# Patient Record
Sex: FEMALE | Race: WHITE | HISPANIC OR LATINO | URBAN - METROPOLITAN AREA
[De-identification: names, ages, dates, MRNs, and addresses within clinical notes are randomized per-mention and may not be internally consistent; named-entity substitution may affect disease eponyms.]

---

## 2020-03-16 ENCOUNTER — TELEPHONE (OUTPATIENT)
Dept: OBSTETRICS AND GYNECOLOGY | Facility: CLINIC | Age: 30
End: 2020-03-16

## 2020-04-27 ENCOUNTER — TELEPHONE (OUTPATIENT)
Dept: OBSTETRICS AND GYNECOLOGY | Facility: CLINIC | Age: 30
End: 2020-04-27

## 2020-06-01 ENCOUNTER — OFFICE VISIT (OUTPATIENT)
Dept: OBSTETRICS AND GYNECOLOGY | Facility: CLINIC | Age: 30
End: 2020-06-01
Payer: COMMERCIAL

## 2020-06-01 VITALS
SYSTOLIC BLOOD PRESSURE: 108 MMHG | BODY MASS INDEX: 23.49 KG/M2 | DIASTOLIC BLOOD PRESSURE: 62 MMHG | WEIGHT: 137.56 LBS | HEIGHT: 64 IN

## 2020-06-01 DIAGNOSIS — Z01.419 ENCOUNTER FOR WELL WOMAN EXAM WITH ROUTINE GYNECOLOGICAL EXAM: Primary | ICD-10-CM

## 2020-06-01 DIAGNOSIS — E28.2 PCOS (POLYCYSTIC OVARIAN SYNDROME): ICD-10-CM

## 2020-06-01 DIAGNOSIS — Z31.69 INFERTILITY COUNSELING: ICD-10-CM

## 2020-06-01 LAB
B-HCG UR QL: NEGATIVE
CTP QC/QA: YES

## 2020-06-01 PROCEDURE — 99999 PR PBB SHADOW E&M-EST. PATIENT-LVL II: ICD-10-PCS | Mod: PBBFAC,,, | Performed by: OBSTETRICS & GYNECOLOGY

## 2020-06-01 PROCEDURE — 99385 PR PREVENTIVE VISIT,NEW,18-39: ICD-10-PCS | Mod: S$GLB,,, | Performed by: OBSTETRICS & GYNECOLOGY

## 2020-06-01 PROCEDURE — 81025 URINE PREGNANCY TEST: CPT | Mod: S$GLB,,, | Performed by: OBSTETRICS & GYNECOLOGY

## 2020-06-01 PROCEDURE — 88175 CYTOPATH C/V AUTO FLUID REDO: CPT

## 2020-06-01 PROCEDURE — 81025 POCT URINE PREGNANCY: ICD-10-PCS | Mod: S$GLB,,, | Performed by: OBSTETRICS & GYNECOLOGY

## 2020-06-01 PROCEDURE — 99385 PREV VISIT NEW AGE 18-39: CPT | Mod: S$GLB,,, | Performed by: OBSTETRICS & GYNECOLOGY

## 2020-06-01 PROCEDURE — 3008F PR BODY MASS INDEX (BMI) DOCUMENTED: ICD-10-PCS | Mod: CPTII,S$GLB,, | Performed by: OBSTETRICS & GYNECOLOGY

## 2020-06-01 PROCEDURE — 99999 PR PBB SHADOW E&M-EST. PATIENT-LVL II: CPT | Mod: PBBFAC,,, | Performed by: OBSTETRICS & GYNECOLOGY

## 2020-06-01 PROCEDURE — 3008F BODY MASS INDEX DOCD: CPT | Mod: CPTII,S$GLB,, | Performed by: OBSTETRICS & GYNECOLOGY

## 2020-06-01 NOTE — PROGRESS NOTES
"GYNECOLOGY  :  Darleen Quarles is a 30 y.o.    Here today for  gyn evaluation   Unable to conceive for +3 years.   Has been on/off Md's in the past, but has [place] followed a consistent infertility plan   Was told has PCOS, was on metformin before  Never sperm analysis or HSG   No complaints today   Normal menstrual cycles, every month, 2-3 days of bleeding w/ some cramping         History reviewed. No pertinent past medical history.  History reviewed. No pertinent surgical history.  Family History   Family history unknown: Yes     Social History     Tobacco Use    Smoking status: Never Smoker    Smokeless tobacco: Never Used   Substance Use Topics    Alcohol use: Never     Frequency: Never    Drug use: Never     OB History    Para Term  AB Living   0 0 0 0 0 0   SAB TAB Ectopic Multiple Live Births   0 0 0 0 0       /62 (BP Location: Right arm)   Ht 5' 4" (1.626 m)   Wt 62.4 kg (137 lb 9.1 oz)   LMP 2020   BMI 23.61 kg/m²     Last PAP= 1 year normal   LMP = 20  Last Mammogram = -  Last Colonoscopy  =-      COMPREHENSIVE GYN HISTORY:  G 0 P 0     PAP History: Denies abnormal Paps.  Infection History: Denies STDs. Denies PID.  Benign History: Denies uterine fibroids. Denies ovarian cysts. Denies endometriosis.   Cancer History: Denies cervical cancer. Denies uterine cancer or hyperplasia. Denies ovarian cancer. Denies vulvar cancer or pre-cancer. Denies vaginal cancer or pre-cancer. Denies breast cancer. Denies colon cancer.  Sexual Activity History: ( x ) Yes   ( - )   no   Menstrual History: Age of menarche: ( 12  )  years. Every (  30 )       days, flows for (  2 )   days. moderate  flow.  Dysmenorrhea History:  absent  Contraception:  -    ROS  GENERAL: Denies significant weight gain or weight loss. Feeling well overall.  SKIN: Denies rash or lesions.  Normal skin turgor  HEAD: Denies head injury or headache.   NODES: Denies enlarged lymph nodes. "   CHEST: Denies chest pain or shortness of breath.   CARDIOVASCULAR: Denies palpitations or left sided chest pain.   ABDOMEN: No abdominal pain,no  diarrhea,constipation  nausea, vomiting or rectal bleeding.   URINARY: normal  Frequency,no  Dysuria or burning on urination.   REPRODUCTIVE: Per HPI   BREASTS: The patient sometimes performs breast self-examination and denies pain, lumps, or nipple discharge.   HEMATOLOGIC: No easy bruisability or excessive bleeding.   MUSCULOSKELETAL: Denies joint pain or swelling.   NEUROLOGIC: Denies syncope or weakness.   PSYCHIATRIC: Denies depression, anxiety or mood swings.    Physical Exam     Constitutional: She is oriented to person, place, and time. She appears well-developed and well-nourished. No distress.   HENT:   Head: Normocephalic.   NECK: Neck symmetric without masses or thyromegaly.  Cardiovascular: Normal rate and regular rhythm.   Pulmonary/Chest: Effort normal and breath sounds normal. No respiratory distress. She has no wheezes.   Breasts: Symmetrical, no skin changes or visible lesions. No palpable masses, nipple discharge or adenopathy bilaterally.  Abdominal: Soft not distended. Bowel sounds are normal. She exhibits   no masses . No tenderness to palpation.   Genitourinary: Pelvic exam was performed with patient supine.   External genitalia normal no lesions.Normal hair distribution   Adequate perineal body,normal urethral meatus   Vagina moist and well rugated without lesions, no vaginal  Discharge.   Cervix pink and without lesions. No bleeding. No significant cystocele or rectocele.  Bimanual exam showed uterus normal size, shape and position , mobile and nontender. Adnexa without masses or tenderness. Urethra and bladder normal  Extremities normal no cyanosis ,edema.     Procedures:  Pap smear  UA/ UCx/Udip  Affirm  STD testing   US    A/P Darleen Lucas Hong Quarles 30 y.o.     Dx=  1- Infertility   2- Hx PCOs   3-    Plan:    -s/p normal breast  exam   -s/p normal pelvic exam:    Infertility counseling  Provided. I discuss the need to evaluate simultaneously  the main causes of infertility, including tubal , ovarian, uterine and male factor .  Patient counseled that up to 10 % of infertility can be of unknown origin.  Patient understands that After initial work up might need to be referred to specialized fertility center  for advanced fertility procedures .  Will see patient  Again in the clinic  after blood work, US, HSG and Sperm analysis are resulted.      Patient was counseled today on A.C.S. Pap guidelines and recommendations for yearly pelvic exams, mammograms and monthly self breast exams; to see her PCP for other health maintenance, nutrition and weight gain counseling, discussed lab values.  Discussed colonoscopy recommendations every 10 years starting at age 50   Calcium and vit D daily intake     F/u in 1 month or with results     Irineo Mann M.D.   OB/GYN

## 2020-06-05 ENCOUNTER — LAB VISIT (OUTPATIENT)
Dept: LAB | Facility: HOSPITAL | Age: 30
End: 2020-06-05
Attending: OBSTETRICS & GYNECOLOGY
Payer: COMMERCIAL

## 2020-06-05 DIAGNOSIS — E28.2 PCOS (POLYCYSTIC OVARIAN SYNDROME): ICD-10-CM

## 2020-06-05 LAB
FINAL PATHOLOGIC DIAGNOSIS: NORMAL
FSH SERPL-ACNC: 7.7 MIU/ML
Lab: NORMAL
PROLACTIN SERPL IA-MCNC: 23.9 NG/ML (ref 5.2–26.5)
TSH SERPL DL<=0.005 MIU/L-ACNC: 1.3 UIU/ML (ref 0.4–4)

## 2020-06-05 PROCEDURE — 84443 ASSAY THYROID STIM HORMONE: CPT

## 2020-06-05 PROCEDURE — 84146 ASSAY OF PROLACTIN: CPT

## 2020-06-05 PROCEDURE — 83001 ASSAY OF GONADOTROPIN (FSH): CPT

## 2020-06-05 PROCEDURE — 36415 COLL VENOUS BLD VENIPUNCTURE: CPT

## 2020-06-08 ENCOUNTER — PROCEDURE VISIT (OUTPATIENT)
Dept: OBSTETRICS AND GYNECOLOGY | Facility: CLINIC | Age: 30
End: 2020-06-08
Payer: COMMERCIAL

## 2020-06-08 DIAGNOSIS — E28.2 PCOS (POLYCYSTIC OVARIAN SYNDROME): ICD-10-CM

## 2020-06-08 PROCEDURE — 76830 PR  ECHOGRAPHY,TRANSVAGINAL: ICD-10-PCS | Mod: S$GLB,,, | Performed by: OBSTETRICS & GYNECOLOGY

## 2020-06-08 PROCEDURE — 76830 TRANSVAGINAL US NON-OB: CPT | Mod: S$GLB,,, | Performed by: OBSTETRICS & GYNECOLOGY

## 2020-06-15 ENCOUNTER — TELEPHONE (OUTPATIENT)
Dept: OBSTETRICS AND GYNECOLOGY | Facility: CLINIC | Age: 30
End: 2020-06-15

## 2020-06-15 NOTE — TELEPHONE ENCOUNTER
Patient is requesting lab results done 6/5/2020 and ultrasound results done 6/8/2020. Please advise

## 2020-06-15 NOTE — TELEPHONE ENCOUNTER
----- Message from Emmanuelle Rendon sent at 6/15/2020  2:37 PM CDT -----  Regarding: Personal  Contact: self [phone]  TEST RESULTS:   Patient would like to get test results.  Name of test (lab, mammo, etc.): Ultrasound and Labs   Date of test: 6-8-20

## 2020-06-24 ENCOUNTER — TELEPHONE (OUTPATIENT)
Dept: OBSTETRICS AND GYNECOLOGY | Facility: CLINIC | Age: 30
End: 2020-06-24

## 2020-06-24 NOTE — TELEPHONE ENCOUNTER
Patient is requesting lab and ultrasound results. Labs done 6/5/2020 and ultrasound done 6/8/2020. Please advise  
No

## 2020-07-07 ENCOUNTER — OFFICE VISIT (OUTPATIENT)
Dept: OBSTETRICS AND GYNECOLOGY | Facility: CLINIC | Age: 30
End: 2020-07-07
Payer: COMMERCIAL

## 2020-07-07 VITALS
SYSTOLIC BLOOD PRESSURE: 120 MMHG | HEIGHT: 64 IN | WEIGHT: 135.56 LBS | BODY MASS INDEX: 23.14 KG/M2 | DIASTOLIC BLOOD PRESSURE: 60 MMHG

## 2020-07-07 DIAGNOSIS — Z31.69 INFERTILITY COUNSELING: Primary | ICD-10-CM

## 2020-07-07 PROCEDURE — 99213 OFFICE O/P EST LOW 20 MIN: CPT | Mod: S$GLB,,, | Performed by: OBSTETRICS & GYNECOLOGY

## 2020-07-07 PROCEDURE — 99213 PR OFFICE/OUTPT VISIT, EST, LEVL III, 20-29 MIN: ICD-10-PCS | Mod: S$GLB,,, | Performed by: OBSTETRICS & GYNECOLOGY

## 2020-07-07 PROCEDURE — 3008F BODY MASS INDEX DOCD: CPT | Mod: CPTII,S$GLB,, | Performed by: OBSTETRICS & GYNECOLOGY

## 2020-07-07 PROCEDURE — 99999 PR PBB SHADOW E&M-EST. PATIENT-LVL II: CPT | Mod: PBBFAC,,, | Performed by: OBSTETRICS & GYNECOLOGY

## 2020-07-07 PROCEDURE — 3008F PR BODY MASS INDEX (BMI) DOCUMENTED: ICD-10-PCS | Mod: CPTII,S$GLB,, | Performed by: OBSTETRICS & GYNECOLOGY

## 2020-07-07 PROCEDURE — 99999 PR PBB SHADOW E&M-EST. PATIENT-LVL II: ICD-10-PCS | Mod: PBBFAC,,, | Performed by: OBSTETRICS & GYNECOLOGY

## 2020-07-18 NOTE — PROGRESS NOTES
"GYNECOLOGY  :  Darleen Quarles is a 30 y.o.    Here today for  FOllow up     gyn evaluation   Unable to conceive for +3 years.   Has been on/off Md's in the past, but has [place] followed a consistent infertility plan   Was told has PCOS, was on metformin before  Never sperm analysis or HSG   No complaints today   Normal menstrual cycles, every month, 2-3 days of bleeding w/ some cramping         No past medical history on file.  No past surgical history on file.  Family History   Family history unknown: Yes     Social History     Tobacco Use    Smoking status: Never Smoker    Smokeless tobacco: Never Used   Substance Use Topics    Alcohol use: Never     Frequency: Never    Drug use: Never     OB History    Para Term  AB Living   0 0 0 0 0 0   SAB TAB Ectopic Multiple Live Births   0 0 0 0 0       /60 (BP Location: Left arm, Patient Position: Sitting)   Ht 5' 4" (1.626 m)   Wt 61.5 kg (135 lb 9.3 oz)   LMP 2020   BMI 23.27 kg/m²     Last PAP= 1 year normal   LMP = 20  Last Mammogram = -  Last Colonoscopy  =-      COMPREHENSIVE GYN HISTORY:  G 0 P 0     PAP History: Denies abnormal Paps.  Infection History: Denies STDs. Denies PID.  Benign History: Denies uterine fibroids. Denies ovarian cysts. Denies endometriosis.   Cancer History: Denies cervical cancer. Denies uterine cancer or hyperplasia. Denies ovarian cancer. Denies vulvar cancer or pre-cancer. Denies vaginal cancer or pre-cancer. Denies breast cancer. Denies colon cancer.  Sexual Activity History: ( x ) Yes   ( - )   no   Menstrual History: Age of menarche: ( 12  )  years. Every (  30 )       days, flows for (  2 )   days. moderate  flow.  Dysmenorrhea History:  absent  Contraception:  -    ROS  GENERAL: Denies significant weight gain or weight loss. Feeling well overall.  SKIN: Denies rash or lesions.  Normal skin turgor  HEAD: Denies head injury or headache.   NODES: Denies enlarged lymph nodes. "   CHEST: Denies chest pain or shortness of breath.   CARDIOVASCULAR: Denies palpitations or left sided chest pain.   ABDOMEN: No abdominal pain,no  diarrhea,constipation  nausea, vomiting or rectal bleeding.   URINARY: normal  Frequency,no  Dysuria or burning on urination.   REPRODUCTIVE: Per HPI   BREASTS: The patient sometimes performs breast self-examination and denies pain, lumps, or nipple discharge.   HEMATOLOGIC: No easy bruisability or excessive bleeding.   MUSCULOSKELETAL: Denies joint pain or swelling.   NEUROLOGIC: Denies syncope or weakness.   PSYCHIATRIC: Denies depression, anxiety or mood swings.    Physical Exam     Constitutional: She is oriented to person, place, and time. She appears well-developed and well-nourished. No distress.   HENT:   Head: Normocephalic.   NECK: Neck symmetric without masses or thyromegaly.  Cardiovascular: Normal rate and regular rhythm.   Pulmonary/Chest: Effort normal and breath sounds normal. No respiratory distress. She has no wheezes.   Breasts: Symmetrical, no skin changes or visible lesions. No palpable masses, nipple discharge or adenopathy bilaterally.  Abdominal: Soft not distended. Bowel sounds are normal. She exhibits   no masses . No tenderness to palpation.   Genitourinary: Pelvic exam was performed with patient supine.   External genitalia normal no lesions.Normal hair distribution   Adequate perineal body,normal urethral meatus   Vagina moist and well rugated without lesions, no vaginal  Discharge.   Cervix pink and without lesions. No bleeding. No significant cystocele or rectocele.  Bimanual exam showed uterus normal size, shape and position , mobile and nontender. Adnexa without masses or tenderness. Urethra and bladder normal  Extremities normal no cyanosis ,edema.     Procedures:  Pap smear  UA/ UCx/Udip  Affirm  STD testing   US    A/P Lamarkeyanna Becerraernestine Pitts Quarles 30 y.o.     Dx=  1- Infertility   2- Hx PCOs   3-    Plan:  FSH , Prolactin and  TSH within normal limits    Brings Normal sperm analysis   HCG is pending  to verify tubal patency     Pelvic US= Normal uterus and  Normal endometrial cavity                      Small cysts Left Ovary < 2cms                  Normal right ovary         Infertility counseling  Provided. I discuss the need to evaluate simultaneously  the main causes of infertility, including tubal , ovarian, uterine and male factor .  Patient counseled that up to 10 % of infertility can be of unknown origin.  Patient understands that After initial work up might need to be referred to specialized fertility center  for advanced fertility procedures .  Will see patient  Again in the clinic  after blood work, US, HSG and Sperm analysis are resulted.    Will  Call  [facility] to  Re- fax HCG results       Irineo Mann M.D.   OB/GYN

## 2020-08-03 ENCOUNTER — TELEPHONE (OUTPATIENT)
Dept: OBSTETRICS AND GYNECOLOGY | Facility: CLINIC | Age: 30
End: 2020-08-03

## 2020-08-03 DIAGNOSIS — N70.11 HYDROSALPINX: Primary | ICD-10-CM

## 2020-08-03 NOTE — TELEPHONE ENCOUNTER
----- Message from Irineo Mann MD sent at 7/20/2020  2:16 PM CDT -----  Yahir Belcher.     This lady needs to be scheduled x surgery               Diagnosis-               Hydrosalpinx                                      Procedure -           Dx Laparoscopy , salpingectomy, chromopertubation                                          Assistant=   oksana   Equipment =       Thanks     Irineo Mann M.D.   OB/GYN

## 2020-08-05 DIAGNOSIS — Z00.00 ROUTINE GENERAL MEDICAL EXAMINATION AT A HEALTH CARE FACILITY: Primary | ICD-10-CM

## 2020-09-17 ENCOUNTER — LAB VISIT (OUTPATIENT)
Dept: LAB | Facility: HOSPITAL | Age: 30
End: 2020-09-17
Attending: OBSTETRICS & GYNECOLOGY
Payer: COMMERCIAL

## 2020-09-17 ENCOUNTER — OFFICE VISIT (OUTPATIENT)
Dept: OBSTETRICS AND GYNECOLOGY | Facility: CLINIC | Age: 30
End: 2020-09-17
Payer: COMMERCIAL

## 2020-09-17 VITALS
DIASTOLIC BLOOD PRESSURE: 68 MMHG | HEIGHT: 64 IN | SYSTOLIC BLOOD PRESSURE: 116 MMHG | WEIGHT: 140.88 LBS | BODY MASS INDEX: 24.05 KG/M2

## 2020-09-17 DIAGNOSIS — N70.11 HYDROSALPINX: Primary | ICD-10-CM

## 2020-09-17 DIAGNOSIS — N70.11 HYDROSALPINX: ICD-10-CM

## 2020-09-17 LAB
ABO + RH BLD: NORMAL
ANION GAP SERPL CALC-SCNC: 9 MMOL/L (ref 8–16)
BASOPHILS # BLD AUTO: 0.04 K/UL (ref 0–0.2)
BASOPHILS NFR BLD: 0.6 % (ref 0–1.9)
BLD GP AB SCN CELLS X3 SERPL QL: NORMAL
BUN SERPL-MCNC: 12 MG/DL (ref 6–20)
CALCIUM SERPL-MCNC: 9.3 MG/DL (ref 8.7–10.5)
CHLORIDE SERPL-SCNC: 104 MMOL/L (ref 95–110)
CO2 SERPL-SCNC: 28 MMOL/L (ref 23–29)
CREAT SERPL-MCNC: 0.7 MG/DL (ref 0.5–1.4)
DIFFERENTIAL METHOD: NORMAL
EOSINOPHIL # BLD AUTO: 0.2 K/UL (ref 0–0.5)
EOSINOPHIL NFR BLD: 2.7 % (ref 0–8)
ERYTHROCYTE [DISTWIDTH] IN BLOOD BY AUTOMATED COUNT: 12.3 % (ref 11.5–14.5)
EST. GFR  (AFRICAN AMERICAN): >60 ML/MIN/1.73 M^2
EST. GFR  (NON AFRICAN AMERICAN): >60 ML/MIN/1.73 M^2
GLUCOSE SERPL-MCNC: 81 MG/DL (ref 70–110)
HCT VFR BLD AUTO: 41.5 % (ref 37–48.5)
HGB BLD-MCNC: 13.4 G/DL (ref 12–16)
IMM GRANULOCYTES # BLD AUTO: 0.02 K/UL (ref 0–0.04)
IMM GRANULOCYTES NFR BLD AUTO: 0.3 % (ref 0–0.5)
LYMPHOCYTES # BLD AUTO: 2.3 K/UL (ref 1–4.8)
LYMPHOCYTES NFR BLD: 36.7 % (ref 18–48)
MCH RBC QN AUTO: 29.8 PG (ref 27–31)
MCHC RBC AUTO-ENTMCNC: 32.3 G/DL (ref 32–36)
MCV RBC AUTO: 92 FL (ref 82–98)
MONOCYTES # BLD AUTO: 0.4 K/UL (ref 0.3–1)
MONOCYTES NFR BLD: 5.9 % (ref 4–15)
NEUTROPHILS # BLD AUTO: 3.4 K/UL (ref 1.8–7.7)
NEUTROPHILS NFR BLD: 53.8 % (ref 38–73)
NRBC BLD-RTO: 0 /100 WBC
PLATELET # BLD AUTO: 304 K/UL (ref 150–350)
PMV BLD AUTO: 9.7 FL (ref 9.2–12.9)
POTASSIUM SERPL-SCNC: 4.1 MMOL/L (ref 3.5–5.1)
RBC # BLD AUTO: 4.5 M/UL (ref 4–5.4)
SODIUM SERPL-SCNC: 141 MMOL/L (ref 136–145)
WBC # BLD AUTO: 6.3 K/UL (ref 3.9–12.7)

## 2020-09-17 PROCEDURE — 86901 BLOOD TYPING SEROLOGIC RH(D): CPT

## 2020-09-17 PROCEDURE — 85025 COMPLETE CBC W/AUTO DIFF WBC: CPT

## 2020-09-17 PROCEDURE — 99499 UNLISTED E&M SERVICE: CPT | Mod: S$GLB,,, | Performed by: OBSTETRICS & GYNECOLOGY

## 2020-09-17 PROCEDURE — 80048 BASIC METABOLIC PNL TOTAL CA: CPT

## 2020-09-17 PROCEDURE — 99499 NO LOS: ICD-10-PCS | Mod: S$GLB,,, | Performed by: OBSTETRICS & GYNECOLOGY

## 2020-09-17 PROCEDURE — 99999 PR PBB SHADOW E&M-EST. PATIENT-LVL II: CPT | Mod: PBBFAC,,, | Performed by: OBSTETRICS & GYNECOLOGY

## 2020-09-17 PROCEDURE — 99999 PR PBB SHADOW E&M-EST. PATIENT-LVL II: ICD-10-PCS | Mod: PBBFAC,,, | Performed by: OBSTETRICS & GYNECOLOGY

## 2020-09-17 PROCEDURE — 36415 COLL VENOUS BLD VENIPUNCTURE: CPT

## 2020-09-17 RX ORDER — MUPIROCIN 20 MG/G
OINTMENT TOPICAL
Status: CANCELLED | OUTPATIENT
Start: 2020-09-17

## 2020-09-17 RX ORDER — SODIUM CHLORIDE 9 MG/ML
INJECTION, SOLUTION INTRAVENOUS CONTINUOUS
Status: CANCELLED | OUTPATIENT
Start: 2020-09-17

## 2020-09-17 NOTE — H&P (VIEW-ONLY)
"GYNECOLOGY  :  Darleen Quarles is a 30 y.o.    Here today for  Prop appointment     Scheduled for Diagnostic laparoscopy + chromotubation  20       Seen before for infertility  Tubal factor    HSG showed left hydrosalpinx ,  Normal  Uterine cavity and normal spillage of contrast  On the right side   FSH , Prolactin and TSH within normal limits    Brings Normal sperm analysis   Pelvic US= Normal uterus and  Normal endometrial cavity                      Small cysts Left Ovary < 2cms                  Normal right ovary             History reviewed. No pertinent past medical history.  History reviewed. No pertinent surgical history.  Family History   Family history unknown: Yes     Social History     Tobacco Use    Smoking status: Never Smoker    Smokeless tobacco: Never Used   Substance Use Topics    Alcohol use: Never     Frequency: Never    Drug use: Never     OB History    Para Term  AB Living   0 0 0 0 0 0   SAB TAB Ectopic Multiple Live Births   0 0 0 0 0       /68 (BP Location: Right arm)   Ht 5' 4" (1.626 m)   Wt 63.9 kg (140 lb 14 oz)   LMP 2020   BMI 24.18 kg/m²     Last PAP= 1 year normal   LMP = 20  Last Mammogram = -  Last Colonoscopy  =-      COMPREHENSIVE GYN HISTORY:  G 0 P 0     PAP History: Denies abnormal Paps.  Infection History: Denies STDs. Denies PID.  Benign History: Denies uterine fibroids. Denies ovarian cysts. Denies endometriosis.   Cancer History: Denies cervical cancer. Denies uterine cancer or hyperplasia. Denies ovarian cancer. Denies vulvar cancer or pre-cancer. Denies vaginal cancer or pre-cancer. Denies breast cancer. Denies colon cancer.  Sexual Activity History: ( x ) Yes   ( - )   no   Menstrual History: Age of menarche: ( 12  )  years. Every (  30 )       days, flows for (  2 )   days. moderate  flow.  Dysmenorrhea History:  absent  Contraception:  -    ROS  GENERAL: Denies significant weight gain or weight loss. " Feeling well overall.  SKIN: Denies rash or lesions.  Normal skin turgor  HEAD: Denies head injury or headache.   NODES: Denies enlarged lymph nodes.   CHEST: Denies chest pain or shortness of breath.   CARDIOVASCULAR: Denies palpitations or left sided chest pain.   ABDOMEN: No abdominal pain,no  diarrhea,constipation  nausea, vomiting or rectal bleeding.   URINARY: normal  Frequency,no  Dysuria or burning on urination.   REPRODUCTIVE: Per HPI   BREASTS: The patient sometimes performs breast self-examination and denies pain, lumps, or nipple discharge.   HEMATOLOGIC: No easy bruisability or excessive bleeding.   MUSCULOSKELETAL: Denies joint pain or swelling.   NEUROLOGIC: Denies syncope or weakness.   PSYCHIATRIC: Denies depression, anxiety or mood swings.    Physical Exam     Constitutional: She is oriented to person, place, and time. She appears well-developed and well-nourished. No distress.   HENT:   Head: Normocephalic.   NECK: Neck symmetric without masses or thyromegaly.  Cardiovascular: Normal rate and regular rhythm.   Pulmonary/Chest: Effort normal and breath sounds normal. No respiratory distress. She has no wheezes.   Breasts: Symmetrical, no skin changes or visible lesions. No palpable masses, nipple discharge or adenopathy bilaterally.  Abdominal: Soft not distended. Bowel sounds are normal. She exhibits   no masses . No tenderness to palpation.   Genitourinary: Pelvic exam was performed with patient supine.   External genitalia normal no lesions.Normal hair distribution   Adequate perineal body,normal urethral meatus   Vagina moist and well rugated without lesions, no vaginal  Discharge.   Cervix pink and without lesions. No bleeding. No significant cystocele or rectocele.  Bimanual exam showed uterus normal size, shape and position , mobile and nontender. Adnexa without masses or tenderness. Urethra and bladder normal  Extremities normal no cyanosis ,edema.       A/P Darleen Quarles  30 y.o.     Dx=  1- Infertility  Tubal factor   2- Hx PCOs   3-    Plan:  Infertility counseling  Provided. I discuss the need to evaluate simultaneously  the main causes of infertility, including tubal , ovarian, uterine and male factor .  Patient counseled that up to 10 % of infertility can be of unknown origin.  Patient understands that After initial work up might need to be referred to specialized fertility center  for advanced fertility procedures .  After HSG findings ( [facility])  Patient  Desires to proceed with surgery  Here with me   Diagnostic laparoscopy, chromotubation, and even left salpingectomy per findings         I have discussed the risks, benefits, indications, and alternatives of the procedure in detail.  The patient verbalizes her understanding.  All questions answered.  Consents signed.  The patient agrees to proceed to proceed as planned.    Irineo Mann M.D.   OB/GYN

## 2020-09-20 ENCOUNTER — CLINICAL SUPPORT (OUTPATIENT)
Dept: URGENT CARE | Facility: CLINIC | Age: 30
End: 2020-09-20
Payer: COMMERCIAL

## 2020-09-20 DIAGNOSIS — Z00.00 ROUTINE GENERAL MEDICAL EXAMINATION AT A HEALTH CARE FACILITY: ICD-10-CM

## 2020-09-20 LAB — SARS-COV-2 RNA RESP QL NAA+PROBE: NOT DETECTED

## 2020-09-20 PROCEDURE — U0003 INFECTIOUS AGENT DETECTION BY NUCLEIC ACID (DNA OR RNA); SEVERE ACUTE RESPIRATORY SYNDROME CORONAVIRUS 2 (SARS-COV-2) (CORONAVIRUS DISEASE [COVID-19]), AMPLIFIED PROBE TECHNIQUE, MAKING USE OF HIGH THROUGHPUT TECHNOLOGIES AS DESCRIBED BY CMS-2020-01-R: HCPCS

## 2020-09-22 ENCOUNTER — ANESTHESIA EVENT (OUTPATIENT)
Dept: SURGERY | Facility: HOSPITAL | Age: 30
End: 2020-09-22
Payer: COMMERCIAL

## 2020-09-23 ENCOUNTER — ANESTHESIA (OUTPATIENT)
Dept: SURGERY | Facility: HOSPITAL | Age: 30
End: 2020-09-23
Payer: COMMERCIAL

## 2020-09-23 ENCOUNTER — HOSPITAL ENCOUNTER (OUTPATIENT)
Facility: HOSPITAL | Age: 30
Discharge: HOME | End: 2020-09-23
Attending: OBSTETRICS & GYNECOLOGY | Admitting: OBSTETRICS & GYNECOLOGY
Payer: COMMERCIAL

## 2020-09-23 VITALS
HEART RATE: 71 BPM | SYSTOLIC BLOOD PRESSURE: 106 MMHG | DIASTOLIC BLOOD PRESSURE: 65 MMHG | HEIGHT: 64 IN | RESPIRATION RATE: 17 BRPM | OXYGEN SATURATION: 97 % | WEIGHT: 140 LBS | BODY MASS INDEX: 23.9 KG/M2 | TEMPERATURE: 98 F

## 2020-09-23 DIAGNOSIS — N70.11 HYDROSALPINX: ICD-10-CM

## 2020-09-23 PROCEDURE — 37000009 HC ANESTHESIA EA ADD 15 MINS: Performed by: OBSTETRICS & GYNECOLOGY

## 2020-09-23 PROCEDURE — 58661 LAPAROSCOPY REMOVE ADNEXA: CPT | Mod: ,,, | Performed by: OBSTETRICS & GYNECOLOGY

## 2020-09-23 PROCEDURE — 37000008 HC ANESTHESIA 1ST 15 MINUTES: Performed by: OBSTETRICS & GYNECOLOGY

## 2020-09-23 PROCEDURE — 88302 TISSUE EXAM BY PATHOLOGIST: CPT | Mod: 26,,, | Performed by: PATHOLOGY

## 2020-09-23 PROCEDURE — 25000003 PHARM REV CODE 250: Performed by: STUDENT IN AN ORGANIZED HEALTH CARE EDUCATION/TRAINING PROGRAM

## 2020-09-23 PROCEDURE — 88302 TISSUE EXAM BY PATHOLOGIST: CPT | Performed by: PATHOLOGY

## 2020-09-23 PROCEDURE — 63600175 PHARM REV CODE 636 W HCPCS: Performed by: ANESTHESIOLOGY

## 2020-09-23 PROCEDURE — 58350 REOPEN FALLOPIAN TUBE: CPT | Mod: 51,,, | Performed by: OBSTETRICS & GYNECOLOGY

## 2020-09-23 PROCEDURE — 25000003 PHARM REV CODE 250: Performed by: ANESTHESIOLOGY

## 2020-09-23 PROCEDURE — 63600175 PHARM REV CODE 636 W HCPCS: Performed by: STUDENT IN AN ORGANIZED HEALTH CARE EDUCATION/TRAINING PROGRAM

## 2020-09-23 PROCEDURE — 71000015 HC POSTOP RECOV 1ST HR: Performed by: OBSTETRICS & GYNECOLOGY

## 2020-09-23 PROCEDURE — 58661 PR LAP,RMV  ADNEXAL STRUCTURE: ICD-10-PCS | Mod: ,,, | Performed by: OBSTETRICS & GYNECOLOGY

## 2020-09-23 PROCEDURE — 71000016 HC POSTOP RECOV ADDL HR: Performed by: OBSTETRICS & GYNECOLOGY

## 2020-09-23 PROCEDURE — 27201423 OPTIME MED/SURG SUP & DEVICES STERILE SUPPLY: Performed by: OBSTETRICS & GYNECOLOGY

## 2020-09-23 PROCEDURE — 58350 PR REOPEN FALLOPIAN TUBE,CHROMOTUBATION: ICD-10-PCS | Mod: 51,,, | Performed by: OBSTETRICS & GYNECOLOGY

## 2020-09-23 PROCEDURE — 36000708 HC OR TIME LEV III 1ST 15 MIN: Performed by: OBSTETRICS & GYNECOLOGY

## 2020-09-23 PROCEDURE — 71000033 HC RECOVERY, INTIAL HOUR: Performed by: OBSTETRICS & GYNECOLOGY

## 2020-09-23 PROCEDURE — 36000709 HC OR TIME LEV III EA ADD 15 MIN: Performed by: OBSTETRICS & GYNECOLOGY

## 2020-09-23 PROCEDURE — 25000003 PHARM REV CODE 250: Performed by: OBSTETRICS & GYNECOLOGY

## 2020-09-23 PROCEDURE — 88302 PR  SURG PATH,LEVEL II: ICD-10-PCS | Mod: 26,,, | Performed by: PATHOLOGY

## 2020-09-23 RX ORDER — NEOSTIGMINE METHYLSULFATE 1 MG/ML
INJECTION, SOLUTION INTRAVENOUS
Status: DISCONTINUED | OUTPATIENT
Start: 2020-09-23 | End: 2020-09-23

## 2020-09-23 RX ORDER — OXYCODONE AND ACETAMINOPHEN 10; 325 MG/1; MG/1
1 TABLET ORAL EVERY 4 HOURS PRN
Status: CANCELLED | OUTPATIENT
Start: 2020-09-23

## 2020-09-23 RX ORDER — DIPHENHYDRAMINE HYDROCHLORIDE 50 MG/ML
25 INJECTION INTRAMUSCULAR; INTRAVENOUS EVERY 4 HOURS PRN
Status: CANCELLED | OUTPATIENT
Start: 2020-09-23

## 2020-09-23 RX ORDER — DEXAMETHASONE SODIUM PHOSPHATE 4 MG/ML
INJECTION, SOLUTION INTRA-ARTICULAR; INTRALESIONAL; INTRAMUSCULAR; INTRAVENOUS; SOFT TISSUE
Status: DISCONTINUED | OUTPATIENT
Start: 2020-09-23 | End: 2020-09-23

## 2020-09-23 RX ORDER — SODIUM CHLORIDE 9 MG/ML
INJECTION, SOLUTION INTRAVENOUS CONTINUOUS
Status: DISCONTINUED | OUTPATIENT
Start: 2020-09-23 | End: 2020-09-23 | Stop reason: HOSPADM

## 2020-09-23 RX ORDER — ROCURONIUM BROMIDE 10 MG/ML
INJECTION, SOLUTION INTRAVENOUS
Status: DISCONTINUED | OUTPATIENT
Start: 2020-09-23 | End: 2020-09-23

## 2020-09-23 RX ORDER — SODIUM CHLORIDE, SODIUM LACTATE, POTASSIUM CHLORIDE, CALCIUM CHLORIDE 600; 310; 30; 20 MG/100ML; MG/100ML; MG/100ML; MG/100ML
INJECTION, SOLUTION INTRAVENOUS CONTINUOUS PRN
Status: DISCONTINUED | OUTPATIENT
Start: 2020-09-23 | End: 2020-09-23

## 2020-09-23 RX ORDER — KETOROLAC TROMETHAMINE 30 MG/ML
INJECTION, SOLUTION INTRAMUSCULAR; INTRAVENOUS
Status: DISCONTINUED | OUTPATIENT
Start: 2020-09-23 | End: 2020-09-23

## 2020-09-23 RX ORDER — MIDAZOLAM HYDROCHLORIDE 1 MG/ML
INJECTION, SOLUTION INTRAMUSCULAR; INTRAVENOUS
Status: DISCONTINUED | OUTPATIENT
Start: 2020-09-23 | End: 2020-09-23

## 2020-09-23 RX ORDER — NAPROXEN 500 MG/1
500 TABLET ORAL 2 TIMES DAILY
Qty: 25 TABLET | Refills: 0 | Status: SHIPPED | OUTPATIENT
Start: 2020-09-23

## 2020-09-23 RX ORDER — SODIUM CHLORIDE 9 MG/ML
INJECTION, SOLUTION INTRAVENOUS CONTINUOUS
Status: CANCELLED | OUTPATIENT
Start: 2020-09-23

## 2020-09-23 RX ORDER — HYDROMORPHONE HYDROCHLORIDE 2 MG/ML
0.5 INJECTION, SOLUTION INTRAMUSCULAR; INTRAVENOUS; SUBCUTANEOUS EVERY 5 MIN PRN
Status: DISCONTINUED | OUTPATIENT
Start: 2020-09-23 | End: 2020-09-23 | Stop reason: HOSPADM

## 2020-09-23 RX ORDER — SUCCINYLCHOLINE CHLORIDE 20 MG/ML
INJECTION INTRAMUSCULAR; INTRAVENOUS
Status: DISCONTINUED | OUTPATIENT
Start: 2020-09-23 | End: 2020-09-23

## 2020-09-23 RX ORDER — LIDOCAINE HYDROCHLORIDE 20 MG/ML
INJECTION INTRAVENOUS
Status: DISCONTINUED | OUTPATIENT
Start: 2020-09-23 | End: 2020-09-23

## 2020-09-23 RX ORDER — PROPOFOL 10 MG/ML
VIAL (ML) INTRAVENOUS
Status: DISCONTINUED | OUTPATIENT
Start: 2020-09-23 | End: 2020-09-23

## 2020-09-23 RX ORDER — IBUPROFEN 600 MG/1
600 TABLET ORAL EVERY 6 HOURS PRN
Status: CANCELLED | OUTPATIENT
Start: 2020-09-23

## 2020-09-23 RX ORDER — ONDANSETRON 8 MG/1
8 TABLET, ORALLY DISINTEGRATING ORAL EVERY 8 HOURS PRN
Status: CANCELLED | OUTPATIENT
Start: 2020-09-23

## 2020-09-23 RX ORDER — METHYLENE BLUE 10 MG/ML
INJECTION INTRAVENOUS
Status: DISCONTINUED | OUTPATIENT
Start: 2020-09-23 | End: 2020-09-23 | Stop reason: HOSPADM

## 2020-09-23 RX ORDER — MUPIROCIN 20 MG/G
OINTMENT TOPICAL
Status: DISCONTINUED | OUTPATIENT
Start: 2020-09-23 | End: 2020-09-23 | Stop reason: HOSPADM

## 2020-09-23 RX ORDER — DIPHENHYDRAMINE HCL 25 MG
25 CAPSULE ORAL EVERY 4 HOURS PRN
Status: CANCELLED | OUTPATIENT
Start: 2020-09-23

## 2020-09-23 RX ORDER — FENTANYL CITRATE 50 UG/ML
INJECTION, SOLUTION INTRAMUSCULAR; INTRAVENOUS
Status: DISCONTINUED | OUTPATIENT
Start: 2020-09-23 | End: 2020-09-23

## 2020-09-23 RX ORDER — ONDANSETRON 2 MG/ML
4 INJECTION INTRAMUSCULAR; INTRAVENOUS DAILY PRN
Status: DISCONTINUED | OUTPATIENT
Start: 2020-09-23 | End: 2020-09-23 | Stop reason: HOSPADM

## 2020-09-23 RX ORDER — HYDROCODONE BITARTRATE AND ACETAMINOPHEN 5; 325 MG/1; MG/1
1 TABLET ORAL EVERY 4 HOURS PRN
Status: CANCELLED | OUTPATIENT
Start: 2020-09-23

## 2020-09-23 RX ADMIN — MIDAZOLAM 2 MG: 1 INJECTION INTRAMUSCULAR; INTRAVENOUS at 10:09

## 2020-09-23 RX ADMIN — HYDROMORPHONE HYDROCHLORIDE 0.5 MG: 2 INJECTION INTRAMUSCULAR; INTRAVENOUS; SUBCUTANEOUS at 12:09

## 2020-09-23 RX ADMIN — NEOSTIGMINE METHYLSULFATE 4 MG: 1 INJECTION INTRAVENOUS at 11:09

## 2020-09-23 RX ADMIN — ROCURONIUM BROMIDE 20 MG: 10 INJECTION, SOLUTION INTRAVENOUS at 11:09

## 2020-09-23 RX ADMIN — DOXYCYCLINE 100 MG: 100 INJECTION, POWDER, LYOPHILIZED, FOR SOLUTION INTRAVENOUS at 10:09

## 2020-09-23 RX ADMIN — LIDOCAINE HYDROCHLORIDE 60 MG: 20 INJECTION, SOLUTION INTRAVENOUS at 11:09

## 2020-09-23 RX ADMIN — GLYCOPYRROLATE 0.6 MG: 0.2 INJECTION, SOLUTION INTRAMUSCULAR; INTRAVITREAL at 11:09

## 2020-09-23 RX ADMIN — SUCCINYLCHOLINE CHLORIDE 120 MG: 20 INJECTION, SOLUTION INTRAMUSCULAR; INTRAVENOUS at 11:09

## 2020-09-23 RX ADMIN — PROPOFOL 120 MG: 10 INJECTION, EMULSION INTRAVENOUS at 11:09

## 2020-09-23 RX ADMIN — FENTANYL CITRATE 100 MCG: 50 INJECTION, SOLUTION INTRAMUSCULAR; INTRAVENOUS at 11:09

## 2020-09-23 RX ADMIN — SODIUM CHLORIDE, SODIUM LACTATE, POTASSIUM CHLORIDE, AND CALCIUM CHLORIDE: .6; .31; .03; .02 INJECTION, SOLUTION INTRAVENOUS at 10:09

## 2020-09-23 RX ADMIN — KETOROLAC TROMETHAMINE 15 MG: 30 INJECTION, SOLUTION INTRAMUSCULAR; INTRAVENOUS at 12:09

## 2020-09-23 RX ADMIN — PROMETHAZINE HYDROCHLORIDE 6.25 MG: 25 INJECTION INTRAMUSCULAR; INTRAVENOUS at 03:09

## 2020-09-23 RX ADMIN — DEXAMETHASONE SODIUM PHOSPHATE 4 MG: 4 INJECTION, SOLUTION INTRA-ARTICULAR; INTRALESIONAL; INTRAMUSCULAR; INTRAVENOUS; SOFT TISSUE at 11:09

## 2020-09-23 RX ADMIN — FENTANYL CITRATE 50 MCG: 50 INJECTION, SOLUTION INTRAMUSCULAR; INTRAVENOUS at 11:09

## 2020-09-23 NOTE — DISCHARGE INSTRUCTIONS
Después de linda ligadura laparoscópica de las trompas de Falopio  Rodríguez médico le ha hecho un procedimiento quirúrgico de esterilización para prevenir el embarazo. La esterilización quirúrgica previene permanentemente el embarazo. Existen varios métodos diferentes de esterilización, wang todos ellos implican procedimientos quirúrgicos para bloquear las trompas de [place] de manera que el óvulo no pueda llegar al útero, y así evitar que pueda entrar en contacto con un espermatozoide y ser fecundado. Las mujeres optan por la esterilización quirúrgica cuando ewing decidido que no gunnar tener más hijos y no quieren adoptar un método anticonceptivo reversible.    Cuidados en la casa  · Tómese las cosas con calma y repose tranquilamente partha 2 días.  · Puede reanudar marcellus actividades normales y regresar al trabajo al cabo de 48 horas.  · Siga linda dieta jazmine.  · En laci necesario, tome un analgésico sin receta para aliviar dolor.  · No levante nada que pese más de 10 libras (4.5 kg) partha 1 semana después de la cirugía.  · No maneje un automóvil hasta que hayan transcurrido 24 horas desde el procedimiento.  · Reanude las relaciones sexuales cuando se sienta lista para hacerlo.  Visitas de control  · Programe linda visita de control según le indique el personal médico.     Cuándo debe llamar al médico  Llame a rodríguez médico de inmediato si nota cualquiera de estos síntomas:  · Fiebre de más de 101.5°F (38.6°C)  · Escalofríos  · Mareos o desmayos  · Dolor abdominal e hinchazón que empeoran  · Náuseas y vómito  · Indicios de infección (antwon supuración, pus, calor o enrojecimiento) en la jenny de la incisión.   Date Last Reviewed: 5/19/2015  © 2041-3394 [employer]. [address] [place] [id number]. Todos los derechos reservados. Esta información no pretende sustituir la atención médica profesional. Sólo rodríguez médico puede diagnosticar y tratar un problema de amelia.    Anestesia: Anestesia general     Estás vimos  continuamente partha el procedimiento por el proveedor de la anestesia.     Usted tiene programada linda fecha para linda operación quirúrgica. Partha la operación, le administrarán un medicamento anestésico (llamado también anestesia) para que esté cómodo y sin dolor. Rodríguez cirujano le administrará anestesia general. En esta hoja se da más información sobre asha tipo de anestesia.  ¿En qué consiste la anestesia general?  Con la anestesia general usted estará profundamente dormido. La anestesia general se administra en la vladimir (anestesia intravenosa), en los pulmones (anestesia con gas), o de ambas formas. Usted no sentirá nada partha la operación, y tampoco la recordará. El anestesista vigila rodríguez estado y monitoriza rodríguez frecuencia y ritmo cardíaco, rodríguez presión arterial y rodríguez nivel de oxígeno en la vladimir partha todo el procedimiento.  · Anestesia intravenosa, La anestesia intravenosa se administra mediante linda sonda intravenosa en el brazo, y suele darse jannette para que el paciente ya esté dormido cuando le administren la anestesia con gas. Algunos tipos de anestesia intravenosa alivian el dolor, mientras que otros producen relajación. Rodríguez médico decidirá qué tipo de anestesia es más adecuado para rodríguez laci.  · Anestesia con gas. La anestesia con gas se administra en los pulmones por inhalación y suele usarse para mantener al paciente dormido después de recibir anestesia intravenosa. Puede administrarse a través de linda máscara facial, un tubo endotraqueal o linda máscara laríngea.  ¨ Si tiene linda máscara facial, rodríguez anestesista se la colocará sobre la nariz y la boca, probablemente mientras usted todavía está despierto. Usted inhalará oxígeno mientras le inician la anestesia intravenosa, y luego puede añadirse anestesia a través de la máscara.  ¨ Si se usa un tubo endotraqueal o linda máscara laríngea, se los colocarán en la garganta linda vez que se haya dormido.  Medicamentos e instrumentos de anestesia  Probablemente  tendrá:  · Anestesia intravenosa administrada directamente en la vladimir mediante linda sonda intravenosa.  · Anestesia con gas administrada en los pulmones, desde los cuales pasa a la vladimir.  · Un pulsioxímetro colocado en el extremo de un dedo para medir el nivel de oxígeno en la vladimir.  · Electrodos de electrocardiografía para registrar la frecuencia y el ritmo cardíacos.  · Un manguito (esfigmomanómetro) para medir la presión arterial.  Riesgos y posibles complicaciones  La anestesia general tiene ciertos riesgos, entre los cuales se encuentran los siguientes:  · Problemas de la respiración  · Náuseas y vómito  · Garganta irritada o ronquera  · Reacción alérgica a la anestesia  · Persistencia de la insensibilidad en la jenny anestesiada (poco frecuente)  · Ritmo cardíaco irregular (en casos muy poco frecuentes)  · Paro cardíaco (en casos muy poco frecuentes)   Medidas de seguridad relacionadas con la anestesia  · Siga todas las instrucciones que le hayan dado respecto al tiempo que debe pasar sin comer ni beber antes de la operación.  · Asegúrese de informar a rodríguez médico de todos los medicamentos que esté tomando, incluyendo también los se adquieren sin receta, las hierbas medicinales y los suplementos alimenticios.  · Póngase de acuerdo con un familiar o amigo adulto para que lo lleve a rodríguez casa después de la operación.  · Karlos las primeras 24 horas después de la operación:  ¨ No maneje automóviles ni maquinaria o herramientas pesadas.  ¨ No tome decisiones importantes ni firme ningún documento.  ¨ Evite el alcohol.  ¨ De ser posible, consiga a alguien que se quede con usted para ayudarlo a mantenerse fuera de peligro en laci de que surja algún problema.  Date Last Reviewed: 10/16/2014  © 3908-1753 [employer]. [address] [place] [address], [place] [address]. Todos los derechos reservados. Esta información no pretende sustituir la atención médica profesional. Sólo rodríguez médico puede diagnosticar y tratar un  problema de amelia.    Naproxen and naproxen sodium oral immediate-release tablets  ¿Qué es yoselyn medicamento?  El NAPROXENO es un medicamento antiinflamatorio no esteroideo (TANMAY). Se utiliza para reducir la inflamación y tratar el dolor. Yoselyn medicamento se puede utilizar para el dolor dental, dolor de ann y períodos menstruales dolorosos. Yoselyn medicamento también sirve para tratar problemas dolorosos de las articulaciones y los músculos, tales antwon artritis, tendinitis, bursitis y gota.  ¿Cómo shaila utilizar yoselyn medicamento?  [place] yoselyn medicamento por vía oral con un vaso de agua. Siga las instrucciones de la etiqueta del medicamento. Si yoselyn medicamento le produce malestar estomacal, tómelo con alimentos. Trate de no acostarse por lo menos 10 minutos después de tomarlo. [place] marcellus dosis a intervalos regulares. No tome rodríguez medicamento con linda frecuencia mayor a la indicada. El uso prolongado puede aumentar el riesgo de sufrir un ataque cardiaco o un derrame cerebral.  Rodríguez farmacéutico le dará linda [place] del medicamento especial con cada receta y relleno. Asegúrese de leer esta información cada vez cuidadosamente.  Hable con rodríguez pediatra para informarse acerca del uso de yoselyn medicamento en niños. Puede requerir atención especial.  ¿Qué efectos secundarios puedo tener al utilizar yoselyn medicamento?  Efectos secundarios que debe informar a rodríguez médico o a rodríguez profesional de la amelia tan pronto antwon sea posible:  · heces de color oscuro o con vladimir, vladimir en la orina o vómito con vladimir  · visión borrosa  · dolor en el pecho  · dificultad al respirar o sibilancias  · náuseas o vómito  · dolor de estómago severo  · erupción cutánea, enrojecimiento, ampollas o descamación de la piel, urticarias o picazón  · hablar arrastrando las palabras o debilidad en un lado del cuerpo  · hinchazón de párpados, garganta o labios  · aumento de peso o hinchazón que no tienen explicación  · cansancio o debilidad inusual  · color amarillento  de los ojos o la piel  Efectos secundarios que, por lo general, no requieren atención médica (debe informarlos a rodríguez médico o a rodríguez profesional de la amelia si persisten o si son molestos):  · estreñimiento  · dolor de ann  · acidez de estómago  ¿Qué puede interactuar con asha medicamento?  · alcohol  · aspirina  · cidofovir  · diuréticos  · litio  · metotrexato  · otros medicamentos antiinflamatorios, tales antwon quetorolac o prednisona  · pemetrexed  · probenecid  · warfarina  ¿Qué sucede si me olvido de linda dosis?  Si olvida linda dosis, tómela lo antes posible. Si es thee la hora de la próxima dosis, tome sólo rachana dosis. No tome dosis adicionales o dobles.  ¿Dónde shaila guardar mi medicina?  [place] fuera del alcance de los niños.  Guárdela a temperatura ambiente, entre 15 y 30 grados C (59 y 86 grados F). Mantenga le envase erasmo cerrado. Deseche todo el medicamento que no haya utilizado, después de la fecha de vencimiento.  ¿Qué le shaila informar a mi profesional de la amelia antes de atul asha medicamento?  Necesita saber si usted presenta alguno de los siguientes problemas o situaciones:  · asma  · fuma  · consume más de 3 bebidas alcohólicas por día  · enfermedad cardiaca o problemas circulatorios, tales antwon insuficiencia cardiaca o edema de pierna (retención de líquido)  · lori presión sanguínea  · enfermedad renal  · enfermedad hepática  · úlceras o sangrado estomacal  · linda reacción alérgica o inusual al naproxeno, a la aspirina, a otros TANMAY, otros medicamentos, alimentos, colorantes o conservantes  · si está embarazada o buscando quedar embarazada  · si está amamantando a un bebé  ¿A qué shaila estar atento al usar asha medicamento?  Si el dolor no mejora, informe a rodríguez médico o a rodríguez profesional de la amelia. Consulte con rodríguez médico antes de atul otros analgésicos. No se trate usted mismo.  Asha medicamento no previene ataques cardiacos o derrames cerebrales. De hecho, asha medicamento puede aumentar la  posibilidad de padecer un ataque cardiaco o un derrame cerebral. La posibilidad puede aumentar con el uso prolongado de asha medicamento y en pacientes con enfermedad cardiaca. Si está tomando aspirina para la prevención de ataques cardiacos o derrames cerebrales, comuníquese con rodríguez médico o rodríguez profesional de la amelia.  Evite atul otros medicamentos que contienen aspirina, ibuprofeno o naproxeno con asha medicamento. Es probable que se produzcan efectos secundarios, tales antwon molestias estomacales, náuseas o úlceras. No debe de atul asha medicamento con muchos medicamentos disponibles de venta ronel.  Asha medicamento puede provocar úlceras y hemorragia del estómago e intestinos en cualquier momento partha tratamiento. No fume ni ingiera alcohol. [place] irrita aún más el estómago y puede hacerlo más susceptible a daño por el uso de asha medicamento. Pueden ocurrir úlceras y hemorragia sin síntomas de alerta y pueden provocar la muerte.  Puede experimentar mareos o somnolencia. No conduzca ni utilice maquinaria ni anika nada que le exija permanecer en estado de alerta hasta que sepa cómo le afecta asha medicamento. No se siente ni se ponga de pie con rapidez, especialmente si es un paciente de edad avanzada. [place] reduce el riesgo de mareos o desmayos.  Asha medicamento puede hacerle sangrar con mayor facilidad. Trate de no lastimarse los dientes y las encías al cepillarlos o limpiarlos con hilo dental.  © 6900-3948 [employer]. [address] [place] [address], [place] [address]. Todos los derechos reservados. Esta información no pretende sustituir la atención médica profesional. Sólo rodríguez médico puede diagnosticar y tratar un problema de amelia.

## 2020-09-23 NOTE — OP NOTE
OPERATIVE REPORT:    Procedure Date: 9/23/2020    PREOPERATIVE DIAGNOSIS  1 Left Hydrosalpinx         POSTOPERATIVE DIAGNOSIS  1 the same     PROCEDURE:  1. Dx laparoscopy  2. Left Salpingectomy   3- Bilateral Chromotubation     SURGEON: Dr.Juancarlos Mann  ASSISTANT: Jacklyn Mcfarlane       ANESTHESIA: General    COMPLICATIONS: None    EBL: less 20cc  cc    Surgical specimen to pathology:    URINE OUTPUT:200 cc  Clear Urine     FINDINGS:   Normal uterus   Right adnexa= normal ovary , normal fallopian tube , adequate spillage of Methylene blue per fimbria   Left adnexa =  Dilated fallopian tube, Hydrosalpinx 5 x 2 cms normal ovary   No dye spillage from left side seen   Anterior  Cul de sac = normal   Posterior cu lde sac = free, normal   Appendix = normal   Liver =normal     PROCEDURE: Patient was taking to the operating room where general anesthesia was administered and found to be adequate.  She was prepped and draped in the dorsal lithotomy position. A sponge stick was place in vagina for manipulation and a graham catheter was inserted and Flores cannula placed in the uterine cavity through the cervix   Gloves were changed.   A 10mm  infraumbilical skin incision  was made w/ scalpel  And  A Veress needle was inserted into the umbilicus under tenting of the anterior abdominal wall.  Placement into the peritoneal cavity was confirmed via saline drop test.  The abdomen was insufflated to 15mm Hg using Carbon dioxide.  A 5 mm Optiview trocar was advanced through this incision.  Excellent hemostasis was noted.  The patient was placed in deep Trendelenburg.  An 5 mm left lateral skin incision was made with a scalpel and a 5 mm trocar was advanced through this incision under visualization of the camera.  A 5 mm right lateral skin incision was made with the scalpel.  A 5 mm trocar was advanced through this incision under visualization of the camera.  Excellent hemostasis was noted.    Chromotubation  Performed , injecting  5o cc of diluted meth deborah blue to Flores  Flores cannula , with findings described   Pelvic and abdominal  cavity evaluated with findings described  Elevating the left tube , Liga sure device used to cauterize and cut mesosalpinx in a distal to proximal fashion , left tube sent to pathology   Pelvis irrigated with saline   Hemostasis checked again. All instruments are removed.   The abdomen was deflated and all trocars were removed.  The skin was closed with 4 derma bond applied . Vaginal instruments removed   Sponge, lap, and needle counts were correct x 2.  The patient was taken to the recovery room in stable condition   Suggs catheter removed in the OR       Irineo Mann M.D.   OB/GYN    9/23/2020

## 2020-09-23 NOTE — ANESTHESIA PREPROCEDURE EVALUATION
09/23/2020  Darleen Quarles is a 30 y.o., female w/ hydrosalpinx here for laparoscopic salpingectomy.    No past medical history on file.  No past surgical history on file.  Review of patient's allergies indicates:  No Known Allergies      Anesthesia Evaluation    I have reviewed the Patient Summary Reports.    I have reviewed the Nursing Notes. I have reviewed the NPO Status.   I have reviewed the Medications.     Review of Systems  Anesthesia Hx:  No previous Anesthesia    Social:  Non-Smoker, No Alcohol Use    Hematology/Oncology:  Hematology Normal   Oncology Normal     EENT/Dental:EENT/Dental Normal   Cardiovascular:   Exercise tolerance: good    Pulmonary:  Pulmonary Normal    Renal/:  Renal/ Normal     Hepatic/GI:  Hepatic/GI Normal    Musculoskeletal:  Musculoskeletal Normal    Neurological:  Neurology Normal    Endocrine:  Endocrine Normal    Psych:  Psychiatric Normal           Physical Exam  General:  Well nourished    Airway/Jaw/Neck:  Airway Findings: Mouth Opening: Normal Tongue: Normal  General Airway Assessment: Adult  Mallampati: I  TM Distance: Normal, at least 6 cm        Eyes/Ears/Nose:  EYES/EARS/NOSE FINDINGS: Normal   Dental:  DENTAL FINDINGS: NormalDenies any loose teeth   Chest/Lungs:  Chest/Lungs Clear    Heart/Vascular:  Heart Findings: Normal Heart murmur: negative       Mental Status:  Mental Status Findings: Normal      Lab Results   Component Value Date    WBC 6.30 09/17/2020    HGB 13.4 09/17/2020    HCT 41.5 09/17/2020    MCV 92 09/17/2020     09/17/2020         Chemistry        Component Value Date/Time     09/17/2020 1513    K 4.1 09/17/2020 1513     09/17/2020 1513    CO2 28 09/17/2020 1513    BUN 12 09/17/2020 1513    CREATININE 0.7 09/17/2020 1513    GLU 81 09/17/2020 1513        Component Value Date/Time    CALCIUM 9.3 09/17/2020  1513    ESTGFRAFRICA >60 09/17/2020 1513    EGFRNONAA >60 09/17/2020 1513              Anesthesia Plan  Type of Anesthesia, risks & benefits discussed:  Anesthesia Type:  general  Patient's Preference:   Intra-op Monitoring Plan: standard ASA monitors  Intra-op Monitoring Plan Comments:   Post Op Pain Control Plan: multimodal analgesia  Post Op Pain Control Plan Comments:   Induction:   IV  Beta Blocker:  Patient is not currently on a Beta-Blocker (No further documentation required).       Informed Consent: Patient understands risks and agrees with Anesthesia plan.  Questions answered. Anesthesia consent signed with patient.  ASA Score: 1     Day of Surgery Review of History & Physical: I have interviewed and examined the patient. I have reviewed the patient's H&P dated:  There are no significant changes.      Anesthesia Plan Notes: NEEDS UPT        Ready For Surgery From Anesthesia Perspective.

## 2020-09-23 NOTE — ANESTHESIA PROCEDURE NOTES
Intubation  Performed by: Edwin Valenzuela MD  Authorized by: Roselyn Fam MD     Intubation:     Induction:  Intravenous    Intubated:  Postinduction    Mask Ventilation:  Easy mask    Attempts:  2    Attempted By:  Resident anesthesiologist    Method of Intubation:  Direct    Laryngeal View Grade: Grade IIb - only the arytenoids and epiglottis seen      Method of Intubation (2nd Attempt):  Direct    Blade (2nd Attempt):  Taz 3    Laryngeal View Grade (2nd Attempt): Grade I - full view of cords      Difficult Airway Encountered?: No      Airway Device:  Oral endotracheal tube    Airway Device Size:  7.0    Style/Cuff Inflation:  Cuffed    Tube secured:  22    Secured at:  The lips    Placement Verified By:  Capnometry    Complicating Factors:  None    Findings Post-Intubation:  BS equal bilateral

## 2020-09-23 NOTE — TRANSFER OF CARE
"Anesthesia Transfer of Care Note    Patient: Darleen Quarles    Procedure(s) Performed: Procedure(s) (LRB):  SALPINGECTOMY, LAPAROSCOPIC (Left)  LAPAROSCOPY, DIAGNOSTIC (N/A)    Patient location: PACU    Transport from OR: Transported from OR on 6-10 L/min O2 by face mask with adequate spontaneous ventilation    Post pain: adequate analgesia    Post assessment: no apparent anesthetic complications    Post vital signs: stable    Level of consciousness: awake, alert and oriented    Nausea/Vomiting: no nausea/vomiting    Complications: none    Transfer of care protocol was followed      Last vitals:   Visit Vitals  /75 (BP Location: Right arm, Patient Position: Lying)   Pulse 94   Temp 37.2 °C (99 °F) (Skin)   Resp 16   Ht 5' 4" (1.626 m)   Wt 63.5 kg (140 lb)   LMP 09/09/2020   SpO2 100%   Breastfeeding No   BMI 24.03 kg/m²     "

## 2020-09-23 NOTE — INTERVAL H&P NOTE
The patient has been examined and the H&P has been reviewed:    I concur with the findings and no changes have occurred since H&P was written.    Surgery risks, benefits and alternative options discussed and understood by patient/family.    Irineo Mann M.D.   OB/GYN    9/23/2020        Active Hospital Problems    Diagnosis  POA    Hydrosalpinx [N70.11]  Yes      Resolved Hospital Problems   No resolved problems to display.

## 2020-09-23 NOTE — DISCHARGE SUMMARY
[facility]  Obstetrics & Gynecology  Discharge Summary    Patient Name: Darleen Quarles  MRN: [id number]  Admission Date: 9/23/2020  Hospital Length of Stay: 0 days  Discharge Date and Time:  09/23/2020 12:17 PM  Attending Physician: Irineo Mann MD   Discharging Provider: Irineo Mann MD  Primary Care Provider: Primary Doctor No      Hospital Course: Patient was scheduled for an Outpatient surgical procedure   Procedure:    Diagnostic laparoscopy                          Left salpinguectomy                           Bilateral Chromo tubation                           Secondary to: Hydrosalpinx .     Procedure performed without complications see Op note for findings and details   Patient discharged home in good condition , voiding, ambulating and tolerating PO   Rx for pain control was given   General post op recommendations given .  Follow up at the clinic in 2 weeks for incision check .      Procedure(s) (LRB):  SALPINGECTOMY, LAPAROSCOPIC (Left)  LAPAROSCOPY, DIAGNOSTIC (N/A)     Consults:     Significant Diagnostic Studies: Labs: CBC No results for input(s): WBC, HGB, HCT, PLT in the last 48 hours.    Pending Diagnostic Studies:     Procedure Component Value Units Date/Time    Specimen to Pathology, Surgery Gynecology and Obstetrics [[id number]] Collected: 09/23/20 1147    Order Status: Sent Lab Status: In process Updated: 09/23/20 1147        Final Active Diagnoses:    Diagnosis Date Noted POA    PRINCIPAL PROBLEM:  Hydrosalpinx [N70.11] 09/23/2020 Yes      Problems Resolved During this Admission:        Discharged Condition: good    Disposition:     Follow Up:    Patient Instructions:   No discharge procedures on file.  Medications:  Reconciled Home Medications:      Medication List      START taking these medications    naproxen 500 MG tablet  Commonly known as: NAPROSYN  [place] linda tableta (500 mg en total) por vía oral 2 veces al día.  (Take 1 tablet (500 mg total)  by mouth 2 (two) times daily.)            Irineo Mann MD  Obstetrics & Gynecology  [facility]

## 2020-09-24 NOTE — ANESTHESIA POSTPROCEDURE EVALUATION
Anesthesia Post Evaluation    Patient: Darleen Quarles    Procedure(s) Performed: Procedure(s) (LRB):  SALPINGECTOMY, LAPAROSCOPIC (Left)  LAPAROSCOPY, DIAGNOSTIC (N/A)    Final Anesthesia Type: general    Patient location during evaluation: PACU  Patient participation: Yes- Able to Participate  Level of consciousness: awake and alert, oriented and awake  Post-procedure vital signs: reviewed and stable  Pain management: adequate  Airway patency: patent    PONV status at discharge: No PONV  Anesthetic complications: no      Cardiovascular status: blood pressure returned to baseline  Respiratory status: unassisted and room air  Hydration status: euvolemic  Follow-up not needed.          Vitals Value Taken Time   /65 09/23/20 1745   Temp 36.6 °C (97.9 °F) 09/23/20 1745   Pulse 71 09/23/20 1745   Resp 17 09/23/20 1745   SpO2 97 % 09/23/20 1745         Event Time   Out of Recovery 13:05:46         Pain/Radha Score: Pain Rating Prior to Med Admin: 5 (9/23/2020 12:50 PM)  Pain Rating Post Med Admin: 2 (9/23/2020  5:50 PM)  Radha Score: 10 (9/23/2020  5:50 PM)

## 2020-09-25 LAB
FINAL PATHOLOGIC DIAGNOSIS: NORMAL
GROSS: NORMAL

## 2020-10-05 ENCOUNTER — OFFICE VISIT (OUTPATIENT)
Dept: OBSTETRICS AND GYNECOLOGY | Facility: CLINIC | Age: 30
End: 2020-10-05
Payer: COMMERCIAL

## 2020-10-05 VITALS
BODY MASS INDEX: 24.2 KG/M2 | SYSTOLIC BLOOD PRESSURE: 110 MMHG | DIASTOLIC BLOOD PRESSURE: 60 MMHG | WEIGHT: 141.75 LBS | HEIGHT: 64 IN

## 2020-10-05 DIAGNOSIS — N70.11 HYDROSALPINX: Primary | ICD-10-CM

## 2020-10-05 PROCEDURE — 99024 PR POST-OP FOLLOW-UP VISIT: ICD-10-PCS | Mod: S$GLB,,, | Performed by: OBSTETRICS & GYNECOLOGY

## 2020-10-05 PROCEDURE — 99999 PR PBB SHADOW E&M-EST. PATIENT-LVL III: ICD-10-PCS | Mod: PBBFAC,,, | Performed by: OBSTETRICS & GYNECOLOGY

## 2020-10-05 PROCEDURE — 99999 PR PBB SHADOW E&M-EST. PATIENT-LVL III: CPT | Mod: PBBFAC,,, | Performed by: OBSTETRICS & GYNECOLOGY

## 2020-10-05 PROCEDURE — 99024 POSTOP FOLLOW-UP VISIT: CPT | Mod: S$GLB,,, | Performed by: OBSTETRICS & GYNECOLOGY

## 2020-10-05 NOTE — PROGRESS NOTES
"Darleen Quarles is a 30 y.o. female  post-op from a Dx Scope Left salpingectomy + chromotubation  on 20.  Patient is Doing well postoperatively.      The pathology revealed:  Benign hydrosalpinx     History reviewed. No pertinent past medical history.  Past Surgical History:   Procedure Laterality Date    DIAGNOSTIC LAPAROSCOPY N/A 2020    Procedure: LAPAROSCOPY, DIAGNOSTIC;  Surgeon: Irineo Mann MD;  Location: [facility] OR;  Service: OB/GYN;  Laterality: N/A;  video notified  MK    LAPAROSCOPIC SALPINGECTOMY Left 2020    Procedure: SALPINGECTOMY, LAPAROSCOPIC;  Surgeon: Irineo Mann MD;  Location: [facility] OR;  Service: OB/GYN;  Laterality: Left;     Family History   Family history unknown: Yes     Social History     Tobacco Use    Smoking status: Never Smoker    Smokeless tobacco: Never Used   Substance Use Topics    Alcohol use: Never     Frequency: Never    Drug use: Never     OB History    Para Term  AB Living   0 0 0 0 0 0   SAB TAB Ectopic Multiple Live Births   0 0 0 0 0       /60 (BP Location: Right arm)   Ht 5' 4" (1.626 m)   Wt 64.3 kg (141 lb 12.1 oz)   LMP 2020   BMI 24.33 kg/m²     ROS:  GENERAL: No fever, chills, fatigability or weight loss.  VULVAR: No pain, no lesions and no itching.  VAGINAL: No relaxation, no itching, no discharge, no abnormal bleeding and no lesions.  ABDOMEN: No abdominal pain. Denies nausea. Denies vomiting. No diarrhea. No constipation  BREAST: Denies pain. No lumps. No discharge.  URINARY: No incontinence, no nocturia, no frequency and no dysuria.  CARDIOVASCULAR: No chest pain. No shortness of breath. No leg cramps.  NEUROLOGICAL: No headaches. No vision changes.    PE:   ABDOMEN:  Soft , not distended,not tender  BS present. incisions healing, clean, intact.   PELVIC      Normal external genitalia without lesions.  Normal hair distribution.  Adequate perineal body, normal urethral meatus.  " Vagina moist and well rugated without lesions or discharge.  Cervix pink, without lesions, discharge or tenderness.  No significant cystocele or rectocele.  Bimanual exam shows uterus to be normal size, regular, mobile and nontender.  Adnexa without masses or tenderness.        A/P   1- Satisfactory postoperative   2-s/p  Left salpingectomy + chromotubation     Intra operative findings and pathology results discussed with patient    Follow up for annual or when pregnant     Irineo Mann M.D.   OB/GYN

## 2025-03-18 NOTE — PLAN OF CARE
Patient discharged in WC, vss, patient urinated with no issues, all instructions translated by Valentina Polanco at 5pm and right before discharge by spouse, nausea and pain controlled before discharge   Screening Evaluation    I have evaluated the patient for the purpose of an initial medical screening and to expedite care.     Amanda Angel is a 42 year old female who presents to the emergency department today with chest pain and shortness of breath over the past week. Recently dx with endometrial cancer. Not on chemotherapy yet. .     Limited screening exam:  Gen: Pt is alert, in no acute distress  Resp: Breathing comfortably, unlabored      Initial orders have been placed to expedite care. Primary definitive care to be performed by subsequent medical teams.         Nichelle Maya, CNP  03/18/25 [id number]

## (undated) DEVICE — TROCAR KII FIOS 5MM X 100MM

## (undated) DEVICE — KIT ANTIFOG

## (undated) DEVICE — MANIFOLD 4 PORT

## (undated) DEVICE — BANDAGE ADHESIVE

## (undated) DEVICE — PACK BASIC

## (undated) DEVICE — DRAPE LAVH LAPAROSCOPY W/FLUID

## (undated) DEVICE — GLOVE SURG BIOGEL LATEX SZ 7.5

## (undated) DEVICE — ELECTRODE REM PLYHSV RETURN 9

## (undated) DEVICE — SEALER LIGASURE LAP 37CM 5MM

## (undated) DEVICE — ADHESIVE DERMABOND MINI HV

## (undated) DEVICE — SEE MEDLINE ITEM 146292

## (undated) DEVICE — PANTIES FEMININE NAPKIN LG/XLG

## (undated) DEVICE — ADHESIVE DERMABOND ADVANCED

## (undated) DEVICE — NDL HYPO REG 25G X 1 1/2

## (undated) DEVICE — SEE MEDLINE ITEM 152622

## (undated) DEVICE — SYR B-D DISP CONTROL 10CC100/C

## (undated) DEVICE — SYR 10CC LUER LOCK

## (undated) DEVICE — APPLICATOR CHLORAPREP ORN 26ML

## (undated) DEVICE — NDL INSUF ULTRA VERESS 120MM

## (undated) DEVICE — SEE MEDLINE ITEM 156955

## (undated) DEVICE — IRRIGATOR ENDOSCOPY DISP.

## (undated) DEVICE — DRESSING LEUKOPLAST FLEX 1X3IN

## (undated) DEVICE — BLADE SURG CARBON STEEL SZ11

## (undated) DEVICE — COVER OVERHEAD SURG LT BLUE

## (undated) DEVICE — SEE MEDLINE ITEM 154981

## (undated) DEVICE — SEE MEDLINE ITEM 157117

## (undated) DEVICE — SUT 0 VICRYL / UR6 (J603)

## (undated) DEVICE — CATH URETHRAL 16FR RED

## (undated) DEVICE — SEE MEDLINE ITEM 157131

## (undated) DEVICE — SEE MEDLINE ITEM 146355

## (undated) DEVICE — SPONGE DERMA 8PLY 2X2

## (undated) DEVICE — TUBING INSUFFLATION 10

## (undated) DEVICE — DRESSING TEGADERM 2 3/8 X 2.75

## (undated) DEVICE — SEE MEDLINE ITEM 157116